# Patient Record
Sex: FEMALE
[De-identification: names, ages, dates, MRNs, and addresses within clinical notes are randomized per-mention and may not be internally consistent; named-entity substitution may affect disease eponyms.]

---

## 2018-02-24 ENCOUNTER — HOSPITAL ENCOUNTER (EMERGENCY)
Dept: HOSPITAL 90 - EDH | Age: 83
LOS: 1 days | Discharge: ANOTHER HOSPITAL | End: 2018-02-25
Payer: MEDICARE

## 2018-02-24 DIAGNOSIS — I63.9: Primary | ICD-10-CM

## 2018-02-24 DIAGNOSIS — R35.0: ICD-10-CM

## 2018-02-24 DIAGNOSIS — M81.0: ICD-10-CM

## 2018-02-24 DIAGNOSIS — H40.9: ICD-10-CM

## 2018-02-24 DIAGNOSIS — R11.0: ICD-10-CM

## 2018-02-24 DIAGNOSIS — I72.0: ICD-10-CM

## 2018-02-24 DIAGNOSIS — M06.9: ICD-10-CM

## 2018-02-24 DIAGNOSIS — Z79.899: ICD-10-CM

## 2018-02-24 DIAGNOSIS — Z88.8: ICD-10-CM

## 2018-02-24 LAB
ALBUMIN SERPL-MCNC: 3.2 G/DL (ref 3.5–5)
ALT SERPL-CCNC: 15 U/L (ref 12–78)
AMPHETAMINES UR QL SCN: NEGATIVE
APTT PPP: 24 SEC (ref 26.3–35.5)
AST SERPL-CCNC: 17 U/L (ref 10–37)
BARBITURATES UR QL SCN: NEGATIVE
BASOPHILS NFR BLD AUTO: 1.1 % (ref 0–5)
BENZODIAZ UR QL SCN: NEGATIVE
BILIRUB SERPL-MCNC: 0.2 MG/DL (ref 0.2–1)
BUN SERPL-MCNC: 15 MG/DL (ref 7–18)
BZE UR QL SCN: NEGATIVE
CANNABINOIDS UR QL SCN: NEGATIVE
CHLORIDE SERPL-SCNC: 108 MMOL/L (ref 101–111)
CK MB SERPL-MCNC: < 0.5 NG/ML (ref 0.5–3.6)
CK SERPL-CCNC: 68 U/L (ref 21–232)
CO2 SERPL-SCNC: 29 MMOL/L (ref 21–32)
CREAT SERPL-MCNC: 1.1 MG/DL (ref 0.5–1.5)
EOSINOPHIL NFR BLD AUTO: 2.6 % (ref 0–8)
ERYTHROCYTE [DISTWIDTH] IN BLOOD BY AUTOMATED COUNT: 13.9 % (ref 11–15.5)
GFR SERPL CREATININE-BSD FRML MDRD: 50 ML/MIN (ref 60–?)
GLUCOSE SERPL-MCNC: 96 MG/DL (ref 70–105)
HCT VFR BLD AUTO: 34.4 % (ref 36–48)
INR PPP: 0.96 (ref 0.85–1.15)
LYMPHOCYTES NFR SPEC AUTO: 35.3 % (ref 21–51)
MCH RBC QN AUTO: 30.4 PG (ref 27–33)
MCHC RBC AUTO-ENTMCNC: 33.5 G/DL (ref 32–36)
MCV RBC AUTO: 90.5 FL (ref 79–99)
MONOCYTES NFR BLD AUTO: 7.7 % (ref 3–13)
MYOGLOBIN SERPL-MCNC: 49 NG/ML (ref 10–92)
NEUTROPHILS NFR BLD AUTO: 53.3 % (ref 40–77)
NRBC BLD MANUAL-RTO: 0.1 % (ref 0–0.19)
OPIATES UR QL SCN: NEGATIVE
PCP UR QL SCN: NEGATIVE
PH UR STRIP: 6 [PH] (ref 5–8)
PLATELET # BLD AUTO: 209 K/UL (ref 130–400)
POTASSIUM SERPL-SCNC: 3.7 MMOL/L (ref 3.5–5.1)
PROT SERPL-MCNC: 6.7 G/DL (ref 6–8.3)
PROTHROMBIN TIME: 10.1 SEC (ref 9.6–11.6)
RBC # BLD AUTO: 3.8 MIL/UL (ref 4–5.5)
RBC #/AREA URNS HPF: (no result) /HPF (ref 0–1)
SODIUM SERPL-SCNC: 144 MMOL/L (ref 136–145)
SP GR UR STRIP: 1.02 (ref 1–1.03)
UROBILINOGEN UR STRIP-MCNC: 0.2 MG/DL (ref 0.2–1)
WBC # BLD AUTO: 5 K/UL (ref 4.8–10.8)
WBC #/AREA URNS HPF: (no result) /HPF (ref 0–1)

## 2018-02-24 PROCEDURE — 36415 COLL VENOUS BLD VENIPUNCTURE: CPT

## 2018-02-24 PROCEDURE — 99285 EMERGENCY DEPT VISIT HI MDM: CPT

## 2018-02-24 PROCEDURE — 71045 X-RAY EXAM CHEST 1 VIEW: CPT

## 2018-02-24 PROCEDURE — 94761 N-INVAS EAR/PLS OXIMETRY MLT: CPT

## 2018-02-24 PROCEDURE — 82550 ASSAY OF CK (CPK): CPT

## 2018-02-24 PROCEDURE — 82948 REAGENT STRIP/BLOOD GLUCOSE: CPT

## 2018-02-24 PROCEDURE — 85025 COMPLETE CBC W/AUTO DIFF WBC: CPT

## 2018-02-24 PROCEDURE — 82553 CREATINE MB FRACTION: CPT

## 2018-02-24 PROCEDURE — 85610 PROTHROMBIN TIME: CPT

## 2018-02-24 PROCEDURE — 83874 ASSAY OF MYOGLOBIN: CPT

## 2018-02-24 PROCEDURE — 93005 ELECTROCARDIOGRAM TRACING: CPT

## 2018-02-24 PROCEDURE — 85730 THROMBOPLASTIN TIME PARTIAL: CPT

## 2018-02-24 PROCEDURE — 80053 COMPREHEN METABOLIC PANEL: CPT

## 2018-02-24 PROCEDURE — 81001 URINALYSIS AUTO W/SCOPE: CPT

## 2018-02-24 PROCEDURE — 80305 DRUG TEST PRSMV DIR OPT OBS: CPT

## 2018-02-24 PROCEDURE — 84484 ASSAY OF TROPONIN QUANT: CPT

## 2018-02-24 PROCEDURE — 70450 CT HEAD/BRAIN W/O DYE: CPT

## 2020-05-14 ENCOUNTER — HOSPITAL ENCOUNTER (INPATIENT)
Dept: HOSPITAL 90 - EDH | Age: 85
LOS: 6 days | Discharge: HOME HEALTH | DRG: 871 | End: 2020-05-20
Attending: INTERNAL MEDICINE | Admitting: INTERNAL MEDICINE
Payer: MEDICARE

## 2020-05-14 VITALS — SYSTOLIC BLOOD PRESSURE: 119 MMHG | DIASTOLIC BLOOD PRESSURE: 51 MMHG

## 2020-05-14 VITALS — BODY MASS INDEX: 19.91 KG/M2 | WEIGHT: 101.4 LBS | HEIGHT: 60 IN

## 2020-05-14 DIAGNOSIS — E44.1: ICD-10-CM

## 2020-05-14 DIAGNOSIS — E86.0: ICD-10-CM

## 2020-05-14 DIAGNOSIS — G93.41: ICD-10-CM

## 2020-05-14 DIAGNOSIS — R13.10: ICD-10-CM

## 2020-05-14 DIAGNOSIS — I10: ICD-10-CM

## 2020-05-14 DIAGNOSIS — M81.0: ICD-10-CM

## 2020-05-14 DIAGNOSIS — E87.1: ICD-10-CM

## 2020-05-14 DIAGNOSIS — H54.61: ICD-10-CM

## 2020-05-14 DIAGNOSIS — R53.81: ICD-10-CM

## 2020-05-14 DIAGNOSIS — Z88.6: ICD-10-CM

## 2020-05-14 DIAGNOSIS — I69.354: ICD-10-CM

## 2020-05-14 DIAGNOSIS — E87.0: ICD-10-CM

## 2020-05-14 DIAGNOSIS — E86.9: ICD-10-CM

## 2020-05-14 DIAGNOSIS — N39.0: ICD-10-CM

## 2020-05-14 DIAGNOSIS — M06.9: ICD-10-CM

## 2020-05-14 DIAGNOSIS — R62.7: ICD-10-CM

## 2020-05-14 DIAGNOSIS — A41.9: Primary | ICD-10-CM

## 2020-05-14 LAB
ALBUMIN SERPL-MCNC: 3 G/DL (ref 3.5–5)
ALBUMIN SERPL-MCNC: 3.3 G/DL (ref 3.5–5)
ALT SERPL-CCNC: 27 U/L (ref 12–78)
ALT SERPL-CCNC: 30 U/L (ref 12–78)
AST SERPL-CCNC: 32 U/L (ref 10–37)
AST SERPL-CCNC: 35 U/L (ref 10–37)
BASOPHILS NFR BLD AUTO: 0.2 % (ref 0–5)
BILIRUB SERPL-MCNC: 0.3 MG/DL (ref 0.2–1)
BILIRUB SERPL-MCNC: 0.4 MG/DL (ref 0.2–1)
BUN SERPL-MCNC: 24 MG/DL (ref 7–18)
BUN SERPL-MCNC: 28 MG/DL (ref 7–18)
CHLORIDE SERPL-SCNC: 110 MMOL/L (ref 101–111)
CHLORIDE SERPL-SCNC: 114 MMOL/L (ref 101–111)
CO2 SERPL-SCNC: 29 MMOL/L (ref 21–32)
CO2 SERPL-SCNC: 31 MMOL/L (ref 21–32)
CREAT SERPL-MCNC: 0.8 MG/DL (ref 0.5–1.5)
CREAT SERPL-MCNC: 0.9 MG/DL (ref 0.5–1.5)
EOSINOPHIL NFR BLD AUTO: 0 % (ref 0–8)
ERYTHROCYTE [DISTWIDTH] IN BLOOD BY AUTOMATED COUNT: 13.7 % (ref 11–15.5)
GFR SERPL CREATININE-BSD FRML MDRD: 63 ML/MIN (ref 60–?)
GFR SERPL CREATININE-BSD FRML MDRD: 72 ML/MIN (ref 60–?)
GLUCOSE SERPL-MCNC: 87 MG/DL (ref 70–105)
GLUCOSE SERPL-MCNC: 94 MG/DL (ref 70–105)
HCT VFR BLD AUTO: 42.7 % (ref 36–48)
LYMPHOCYTES NFR SPEC AUTO: 9.4 % (ref 21–51)
MCH RBC QN AUTO: 31.5 PG (ref 27–33)
MCHC RBC AUTO-ENTMCNC: 31.6 G/DL (ref 32–36)
MCV RBC AUTO: 99.5 FL (ref 79–99)
MONOCYTES NFR BLD AUTO: 5.1 % (ref 3–13)
NEUTROPHILS NFR BLD AUTO: 84.9 % (ref 40–77)
NRBC BLD MANUAL-RTO: 0 % (ref 0–0.19)
PH UR STRIP: 6.5 [PH] (ref 5–8)
PLATELET # BLD AUTO: 203 K/UL (ref 130–400)
POTASSIUM SERPL-SCNC: 3.3 MMOL/L (ref 3.5–5.1)
POTASSIUM SERPL-SCNC: 3.7 MMOL/L (ref 3.5–5.1)
PROT SERPL-MCNC: 6.3 G/DL (ref 6–8.3)
PROT SERPL-MCNC: 7.1 G/DL (ref 6–8.3)
PROT UR QL STRIP: (no result) MG/DL
RBC # BLD AUTO: 4.29 MIL/UL (ref 4–5.5)
RBC #/AREA URNS HPF: (no result) /HPF (ref 0–1)
SODIUM SERPL-SCNC: 149 MMOL/L (ref 136–145)
SODIUM SERPL-SCNC: 151 MMOL/L (ref 136–145)
SP GR UR STRIP: 1.02 (ref 1–1.03)
UROBILINOGEN UR STRIP-MCNC: 0.2 MG/DL (ref 0.2–1)
WBC # BLD AUTO: 11.2 K/UL (ref 4.8–10.8)
WBC #/AREA URNS HPF: (no result) /HPF (ref 0–1)

## 2020-05-14 PROCEDURE — 80053 COMPREHEN METABOLIC PANEL: CPT

## 2020-05-14 PROCEDURE — 92610 EVALUATE SWALLOWING FUNCTION: CPT

## 2020-05-14 PROCEDURE — 85027 COMPLETE CBC AUTOMATED: CPT

## 2020-05-14 PROCEDURE — 43246 EGD PLACE GASTROSTOMY TUBE: CPT

## 2020-05-14 PROCEDURE — 93005 ELECTROCARDIOGRAM TRACING: CPT

## 2020-05-14 PROCEDURE — 82140 ASSAY OF AMMONIA: CPT

## 2020-05-14 PROCEDURE — 84145 PROCALCITONIN (PCT): CPT

## 2020-05-14 PROCEDURE — 84100 ASSAY OF PHOSPHORUS: CPT

## 2020-05-14 PROCEDURE — 80048 BASIC METABOLIC PNL TOTAL CA: CPT

## 2020-05-14 PROCEDURE — 84443 ASSAY THYROID STIM HORMONE: CPT

## 2020-05-14 PROCEDURE — 84484 ASSAY OF TROPONIN QUANT: CPT

## 2020-05-14 PROCEDURE — 82948 REAGENT STRIP/BLOOD GLUCOSE: CPT

## 2020-05-14 PROCEDURE — 70551 MRI BRAIN STEM W/O DYE: CPT

## 2020-05-14 PROCEDURE — 71045 X-RAY EXAM CHEST 1 VIEW: CPT

## 2020-05-14 PROCEDURE — 81001 URINALYSIS AUTO W/SCOPE: CPT

## 2020-05-14 PROCEDURE — 85025 COMPLETE CBC W/AUTO DIFF WBC: CPT

## 2020-05-14 PROCEDURE — 87088 URINE BACTERIA CULTURE: CPT

## 2020-05-14 PROCEDURE — 36415 COLL VENOUS BLD VENIPUNCTURE: CPT

## 2020-05-14 PROCEDURE — 92522 EVALUATE SPEECH PRODUCTION: CPT

## 2020-05-14 PROCEDURE — 83735 ASSAY OF MAGNESIUM: CPT

## 2020-05-14 NOTE — NUR
Received patient from ED to room 426 at 2005 on room air with no complaints of dizziness or 
weakness. Orders received to start D5W in right AC. MRI ordered and staff asked Nile, the 
patient's daughter about any metal in her body.  She stated she had a right hip surgery with 
a gloria placed. MRI tech contacted to update what patient has for metal and tech stated it 
will be OK.

## 2020-05-15 VITALS — SYSTOLIC BLOOD PRESSURE: 160 MMHG | DIASTOLIC BLOOD PRESSURE: 59 MMHG

## 2020-05-15 VITALS — SYSTOLIC BLOOD PRESSURE: 141 MMHG | DIASTOLIC BLOOD PRESSURE: 41 MMHG

## 2020-05-15 VITALS — SYSTOLIC BLOOD PRESSURE: 128 MMHG | DIASTOLIC BLOOD PRESSURE: 73 MMHG

## 2020-05-15 VITALS — DIASTOLIC BLOOD PRESSURE: 56 MMHG | SYSTOLIC BLOOD PRESSURE: 129 MMHG

## 2020-05-15 VITALS — SYSTOLIC BLOOD PRESSURE: 121 MMHG | DIASTOLIC BLOOD PRESSURE: 64 MMHG

## 2020-05-15 VITALS — SYSTOLIC BLOOD PRESSURE: 136 MMHG | DIASTOLIC BLOOD PRESSURE: 97 MMHG

## 2020-05-15 VITALS — DIASTOLIC BLOOD PRESSURE: 47 MMHG | SYSTOLIC BLOOD PRESSURE: 126 MMHG

## 2020-05-15 LAB
ALBUMIN SERPL-MCNC: 2.8 G/DL (ref 3.5–5)
ALT SERPL-CCNC: 25 U/L (ref 12–78)
AST SERPL-CCNC: 35 U/L (ref 10–37)
BASOPHILS NFR BLD AUTO: 0.3 % (ref 0–5)
BILIRUB SERPL-MCNC: 0.3 MG/DL (ref 0.2–1)
BUN SERPL-MCNC: 23 MG/DL (ref 7–18)
CHLORIDE SERPL-SCNC: 114 MMOL/L (ref 101–111)
CO2 SERPL-SCNC: 27 MMOL/L (ref 21–32)
CREAT SERPL-MCNC: 0.8 MG/DL (ref 0.5–1.5)
EOSINOPHIL NFR BLD AUTO: 0 % (ref 0–8)
ERYTHROCYTE [DISTWIDTH] IN BLOOD BY AUTOMATED COUNT: 13.7 % (ref 11–15.5)
GFR SERPL CREATININE-BSD FRML MDRD: 72 ML/MIN (ref 60–?)
GLUCOSE SERPL-MCNC: 93 MG/DL (ref 70–105)
HCT VFR BLD AUTO: 38.8 % (ref 36–48)
LYMPHOCYTES NFR SPEC AUTO: 12.8 % (ref 21–51)
MCH RBC QN AUTO: 31 PG (ref 27–33)
MCHC RBC AUTO-ENTMCNC: 30.9 G/DL (ref 32–36)
MCV RBC AUTO: 100.3 FL (ref 79–99)
MONOCYTES NFR BLD AUTO: 6.2 % (ref 3–13)
NEUTROPHILS NFR BLD AUTO: 80.2 % (ref 40–77)
NRBC BLD MANUAL-RTO: 0 % (ref 0–0.19)
PH UR STRIP: 7 [PH] (ref 5–8)
PLATELET # BLD AUTO: 188 K/UL (ref 130–400)
POTASSIUM SERPL-SCNC: 3.2 MMOL/L (ref 3.5–5.1)
PROT SERPL-MCNC: 6.1 G/DL (ref 6–8.3)
RBC # BLD AUTO: 3.87 MIL/UL (ref 4–5.5)
RBC #/AREA URNS HPF: (no result) /HPF (ref 0–1)
SODIUM SERPL-SCNC: 150 MMOL/L (ref 136–145)
SP GR UR STRIP: 1.01 (ref 1–1.03)
UROBILINOGEN UR STRIP-MCNC: 0.2 MG/DL (ref 0.2–1)
WBC # BLD AUTO: 9.9 K/UL (ref 4.8–10.8)
WBC #/AREA URNS HPF: (no result) /HPF (ref 0–1)

## 2020-05-15 RX ADMIN — POTASSIUM CHLORIDE PRN MLS/HR: 14.9 INJECTION, SOLUTION INTRAVENOUS at 02:00

## 2020-05-15 RX ADMIN — FAMOTIDINE SCH MG: 10 INJECTION INTRAVENOUS at 08:49

## 2020-05-15 RX ADMIN — POTASSIUM CHLORIDE PRN MLS/HR: 14.9 INJECTION, SOLUTION INTRAVENOUS at 03:14

## 2020-05-15 RX ADMIN — DEXTROSE AND SODIUM CHLORIDE SCH MLS/HR: 5; .9 INJECTION, SOLUTION INTRAVENOUS at 21:51

## 2020-05-15 RX ADMIN — FAMOTIDINE SCH MG: 10 INJECTION INTRAVENOUS at 21:43

## 2020-05-15 RX ADMIN — SODIUM CHLORIDE SCH GM: 9 INJECTION, SOLUTION INTRAVENOUS at 08:49

## 2020-05-15 RX ADMIN — SODIUM CHLORIDE SCH GM: 9 INJECTION, SOLUTION INTRAVENOUS at 00:40

## 2020-05-15 RX ADMIN — ENOXAPARIN SODIUM SCH MG: 30 INJECTION SUBCUTANEOUS at 08:49

## 2020-05-15 RX ADMIN — SODIUM CHLORIDE SCH GM: 9 INJECTION, SOLUTION INTRAVENOUS at 21:43

## 2020-05-15 NOTE — NUR
PM Assessment



Received pt awake, calm with D5W at 75cc/hr infusing well, routine assessment done, pt noted 
oriented only to name, re-  oriented to time/place & made aware NPO for now. Pt currently 
denies pain. Per report waiting for Dr. Pringle to rounds to notify re: speech therapist 
recommendations, PEG placement as family was made aware & pending their final decision.

## 2020-05-15 NOTE — NUR
DCP: HOME



SW spoke to daughter Cindy Schroeder [phone], who pt lives with. pt's grandson 22, also lives 
in the home. Pt has daily provider services 8:30 to 5:30 thru [facility], pt spends most of 
the day in w/c or recliner, is unable to ambulate or stand on her own. Has walker w/c and 
shower bench. PCP is Dr Regalado in [place] and uses [facility] for rx. Daughter states plan is home at 
[place], and pt can transport in private car. Nurse Fede talked to daughter about PEG. waiting 
on decision. CM to follow and assist as needed

-------------------------------------------------------------------------------

Addendum: 05/15/20 at 1512 by JR KERR

-------------------------------------------------------------------------------

Amended: Links added.

## 2020-05-15 NOTE — NUR
DYSPHAGIA EVAL COMPLETED



+S/S OF ASPIRATION WITH THIN, NECTAR, AND HONEY THICK CONSISTENCIES. Pt WITH WET VOCAL 
QUALITY WITH PUDDING AND PUREED. 

RECOMMEND LONG TERM ALTERNATE MEANS OF NUTRITION/HYDRATION AT THIS TIME. 



SLP REVIEWED RESULTS AND RECOMMENDATIONS WITH Pt AND WITH DAUGHTER (ENMA) OVER THE PHONE. 



RECOMMENDATIONS:

DYSPHAGIA THERAPY 3-5XWEEK TO INCREASE ORAL MOTOR STRENGTH AND PHARYNGEAL SWALLOW:

LTG#1: Pt WILL TOLERATE LEAST RESTRICTIVE DIET TO MEET NUTRITION/HYDRATION WITH NO S/S OF 
ASPIRATION. 

LTG#2: SKILLED EDUCATION Pt/FAMILY/STAFF

STG#1: Pt WILL PARTICIPATE IN LARYNGEAL ELEVATION/EXCURSION EXERCISES WITH 80% ACCURACY.

STG#2: Pt WILL PARTICIPATE IN TONGUE BASE RETRACTION EXERCISES WITH 80% ACCURACY.

STG#3: Pt WILL PARTICIPATE IN ORAL MOTOR EXERCISES WITH 80% ACCURACY.

STG#4: Pt WILL TOLERATE THERAPEUTIC TRIALS OF PUDDING THICK LIQUIDS WITH NO OVERT S/S OF 
ASPIRATION.

STG#5: Pt WILL BE ABLE TO PARTICIPATE IN MBSS AFTER 2-4 WEEKS OF THERAPEUTIC INTERVENTION.

STG#6: SKILLED EDUCATION Pt/FAMILY/STAFF.



SLP COORDINATED CARE WITH NURSE BOSWELL. 


-------------------------------------------------------------------------------

Addendum: 05/15/20 at [address] MIGUELINA RAMACHANDRAN

-------------------------------------------------------------------------------

Amended: Links added.

## 2020-05-16 VITALS — SYSTOLIC BLOOD PRESSURE: 134 MMHG | DIASTOLIC BLOOD PRESSURE: 64 MMHG

## 2020-05-16 VITALS — SYSTOLIC BLOOD PRESSURE: 146 MMHG | DIASTOLIC BLOOD PRESSURE: 75 MMHG

## 2020-05-16 VITALS — DIASTOLIC BLOOD PRESSURE: 93 MMHG | SYSTOLIC BLOOD PRESSURE: 134 MMHG

## 2020-05-16 VITALS — DIASTOLIC BLOOD PRESSURE: 100 MMHG | SYSTOLIC BLOOD PRESSURE: 152 MMHG

## 2020-05-16 VITALS — DIASTOLIC BLOOD PRESSURE: 63 MMHG | SYSTOLIC BLOOD PRESSURE: 137 MMHG

## 2020-05-16 LAB
ALBUMIN SERPL-MCNC: 2.8 G/DL (ref 3.5–5)
ALT SERPL-CCNC: 23 U/L (ref 12–78)
AST SERPL-CCNC: 35 U/L (ref 10–37)
BASOPHILS NFR BLD AUTO: 0.2 % (ref 0–5)
BILIRUB SERPL-MCNC: 0.4 MG/DL (ref 0.2–1)
BUN SERPL-MCNC: 12 MG/DL (ref 7–18)
CHLORIDE SERPL-SCNC: 105 MMOL/L (ref 101–111)
CO2 SERPL-SCNC: 24 MMOL/L (ref 21–32)
CREAT SERPL-MCNC: 0.7 MG/DL (ref 0.5–1.5)
EOSINOPHIL NFR BLD AUTO: 0 % (ref 0–8)
ERYTHROCYTE [DISTWIDTH] IN BLOOD BY AUTOMATED COUNT: 13.6 % (ref 11–15.5)
GFR SERPL CREATININE-BSD FRML MDRD: 84 ML/MIN (ref 60–?)
GLUCOSE SERPL-MCNC: 125 MG/DL (ref 70–105)
HCT VFR BLD AUTO: 40.5 % (ref 36–48)
LYMPHOCYTES NFR SPEC AUTO: 8.9 % (ref 21–51)
MAGNESIUM SERPL-MCNC: 1.6 MG/DL (ref 1.8–2.4)
MCH RBC QN AUTO: 30.8 PG (ref 27–33)
MCHC RBC AUTO-ENTMCNC: 32.3 G/DL (ref 32–36)
MCV RBC AUTO: 95.1 FL (ref 79–99)
MONOCYTES NFR BLD AUTO: 4.7 % (ref 3–13)
NEUTROPHILS NFR BLD AUTO: 85.9 % (ref 40–77)
NRBC BLD MANUAL-RTO: 0 % (ref 0–0.19)
PHOSPHATE SERPL-MCNC: 2.1 MG/DL (ref 2.5–4.9)
PLATELET # BLD AUTO: 125 K/UL (ref 130–400)
POTASSIUM SERPL-SCNC: 3.5 MMOL/L (ref 3.5–5.1)
PROT SERPL-MCNC: 6.3 G/DL (ref 6–8.3)
RBC # BLD AUTO: 4.26 MIL/UL (ref 4–5.5)
SODIUM SERPL-SCNC: 137 MMOL/L (ref 136–145)
TSH SERPL DL<=0.05 MIU/L-ACNC: 3.21 UIU/ML (ref 0.36–3.74)
WBC # BLD AUTO: 14.9 K/UL (ref 4.8–10.8)

## 2020-05-16 RX ADMIN — ENOXAPARIN SODIUM SCH MG: 30 INJECTION SUBCUTANEOUS at 09:00

## 2020-05-16 RX ADMIN — SODIUM CHLORIDE SCH GM: 9 INJECTION, SOLUTION INTRAVENOUS at 10:03

## 2020-05-16 RX ADMIN — DEXTROSE AND SODIUM CHLORIDE SCH MLS/HR: 5; .9 INJECTION, SOLUTION INTRAVENOUS at 12:43

## 2020-05-16 RX ADMIN — FAMOTIDINE SCH MG: 10 INJECTION INTRAVENOUS at 10:03

## 2020-05-16 RX ADMIN — SODIUM CHLORIDE SCH GM: 9 INJECTION, SOLUTION INTRAVENOUS at 21:15

## 2020-05-16 RX ADMIN — FAMOTIDINE SCH MG: 10 INJECTION INTRAVENOUS at 21:10

## 2020-05-16 NOTE — NUR
PM Assessment



Received pt sleeping easily awaken with D5W + 20meq KCL at 75cc/hr infusing well. Routine 
assessment done, plan of care discuss, made aware that she is schedules for PEG placement in 
the morning, remain NPO & that she will be bath later. Pt currently denies discomfort. Mouth 
care rendered.

## 2020-05-17 VITALS — DIASTOLIC BLOOD PRESSURE: 67 MMHG | SYSTOLIC BLOOD PRESSURE: 113 MMHG

## 2020-05-17 VITALS — DIASTOLIC BLOOD PRESSURE: 61 MMHG | SYSTOLIC BLOOD PRESSURE: 133 MMHG

## 2020-05-17 VITALS — DIASTOLIC BLOOD PRESSURE: 59 MMHG | SYSTOLIC BLOOD PRESSURE: 130 MMHG

## 2020-05-17 VITALS — DIASTOLIC BLOOD PRESSURE: 71 MMHG | SYSTOLIC BLOOD PRESSURE: 140 MMHG

## 2020-05-17 VITALS — SYSTOLIC BLOOD PRESSURE: 117 MMHG | DIASTOLIC BLOOD PRESSURE: 59 MMHG

## 2020-05-17 VITALS — DIASTOLIC BLOOD PRESSURE: 58 MMHG | SYSTOLIC BLOOD PRESSURE: 108 MMHG

## 2020-05-17 VITALS — SYSTOLIC BLOOD PRESSURE: 133 MMHG | DIASTOLIC BLOOD PRESSURE: 62 MMHG

## 2020-05-17 VITALS — SYSTOLIC BLOOD PRESSURE: 107 MMHG | DIASTOLIC BLOOD PRESSURE: 38 MMHG

## 2020-05-17 VITALS — DIASTOLIC BLOOD PRESSURE: 73 MMHG | SYSTOLIC BLOOD PRESSURE: 135 MMHG

## 2020-05-17 VITALS — SYSTOLIC BLOOD PRESSURE: 133 MMHG | DIASTOLIC BLOOD PRESSURE: 71 MMHG

## 2020-05-17 VITALS — DIASTOLIC BLOOD PRESSURE: 60 MMHG | SYSTOLIC BLOOD PRESSURE: 132 MMHG

## 2020-05-17 VITALS — SYSTOLIC BLOOD PRESSURE: 111 MMHG | DIASTOLIC BLOOD PRESSURE: 48 MMHG

## 2020-05-17 VITALS — SYSTOLIC BLOOD PRESSURE: 111 MMHG | DIASTOLIC BLOOD PRESSURE: 49 MMHG

## 2020-05-17 VITALS — DIASTOLIC BLOOD PRESSURE: 63 MMHG | SYSTOLIC BLOOD PRESSURE: 123 MMHG

## 2020-05-17 VITALS — DIASTOLIC BLOOD PRESSURE: 59 MMHG | SYSTOLIC BLOOD PRESSURE: 120 MMHG

## 2020-05-17 VITALS — DIASTOLIC BLOOD PRESSURE: 49 MMHG | SYSTOLIC BLOOD PRESSURE: 111 MMHG

## 2020-05-17 VITALS — DIASTOLIC BLOOD PRESSURE: 66 MMHG | SYSTOLIC BLOOD PRESSURE: 138 MMHG

## 2020-05-17 VITALS — SYSTOLIC BLOOD PRESSURE: 123 MMHG | DIASTOLIC BLOOD PRESSURE: 62 MMHG

## 2020-05-17 VITALS — DIASTOLIC BLOOD PRESSURE: 73 MMHG | SYSTOLIC BLOOD PRESSURE: 115 MMHG

## 2020-05-17 VITALS — SYSTOLIC BLOOD PRESSURE: 119 MMHG | DIASTOLIC BLOOD PRESSURE: 68 MMHG

## 2020-05-17 VITALS — DIASTOLIC BLOOD PRESSURE: 78 MMHG | SYSTOLIC BLOOD PRESSURE: 129 MMHG

## 2020-05-17 LAB
ALBUMIN SERPL-MCNC: 2.6 G/DL (ref 3.5–5)
ALT SERPL-CCNC: 23 U/L (ref 12–78)
AST SERPL-CCNC: 29 U/L (ref 10–37)
BASOPHILS NFR BLD AUTO: 0.2 % (ref 0–5)
BILIRUB SERPL-MCNC: 0.4 MG/DL (ref 0.2–1)
BUN SERPL-MCNC: 8 MG/DL (ref 7–18)
CHLORIDE SERPL-SCNC: 101 MMOL/L (ref 101–111)
CO2 SERPL-SCNC: 23 MMOL/L (ref 21–32)
CREAT SERPL-MCNC: 0.7 MG/DL (ref 0.5–1.5)
EOSINOPHIL NFR BLD AUTO: 0 % (ref 0–8)
ERYTHROCYTE [DISTWIDTH] IN BLOOD BY AUTOMATED COUNT: 13.2 % (ref 11–15.5)
GFR SERPL CREATININE-BSD FRML MDRD: 84 ML/MIN (ref 60–?)
GLUCOSE SERPL-MCNC: 141 MG/DL (ref 70–105)
HCT VFR BLD AUTO: 45.6 % (ref 36–48)
LYMPHOCYTES NFR SPEC AUTO: 7.4 % (ref 21–51)
MCH RBC QN AUTO: 30.9 PG (ref 27–33)
MCHC RBC AUTO-ENTMCNC: 32.7 G/DL (ref 32–36)
MCV RBC AUTO: 94.6 FL (ref 79–99)
MONOCYTES NFR BLD AUTO: 5.2 % (ref 3–13)
NEUTROPHILS NFR BLD AUTO: 86.6 % (ref 40–77)
NRBC BLD MANUAL-RTO: 0 % (ref 0–0.19)
PLATELET # BLD AUTO: 229 K/UL (ref 130–400)
POTASSIUM SERPL-SCNC: 4.1 MMOL/L (ref 3.5–5.1)
PROT SERPL-MCNC: 6.7 G/DL (ref 6–8.3)
RBC # BLD AUTO: 4.82 MIL/UL (ref 4–5.5)
SODIUM SERPL-SCNC: 133 MMOL/L (ref 136–145)
WBC # BLD AUTO: 15.9 K/UL (ref 4.8–10.8)

## 2020-05-17 PROCEDURE — 0DH63UZ INSERTION OF FEEDING DEVICE INTO STOMACH, PERCUTANEOUS APPROACH: ICD-10-PCS | Performed by: INTERNAL MEDICINE

## 2020-05-17 RX ADMIN — ENOXAPARIN SODIUM SCH MG: 30 INJECTION SUBCUTANEOUS at 09:00

## 2020-05-17 RX ADMIN — SODIUM CHLORIDE SCH MLS/HR: 0.9 INJECTION, SOLUTION INTRAVENOUS at 15:30

## 2020-05-17 RX ADMIN — FAMOTIDINE SCH MG: 10 INJECTION INTRAVENOUS at 20:04

## 2020-05-17 RX ADMIN — SODIUM CHLORIDE SCH GM: 9 INJECTION, SOLUTION INTRAVENOUS at 13:03

## 2020-05-17 RX ADMIN — SODIUM CHLORIDE SCH GM: 9 INJECTION, SOLUTION INTRAVENOUS at 20:05

## 2020-05-17 RX ADMIN — FAMOTIDINE SCH MG: 10 INJECTION INTRAVENOUS at 13:04

## 2020-05-17 RX ADMIN — DEXTROSE AND SODIUM CHLORIDE SCH MLS/HR: 5; .9 INJECTION, SOLUTION INTRAVENOUS at 01:00

## 2020-05-17 NOTE — NUR
Re: PEG placement



Pt taken down to GI per bed at this time for PEG placement, phoned daughter Cindy & made 
aware, then Chata from GI lab spoke with her discuss the type of anesthesia that will be used 
for this procedure.

## 2020-05-17 NOTE — NUR
Late entry , adm igm ancef iv push times once per verbal order of Doctor Ken for 
insertion of peg tube, patient tolerate well, adm at 0708 am

## 2020-05-18 VITALS — SYSTOLIC BLOOD PRESSURE: 129 MMHG | DIASTOLIC BLOOD PRESSURE: 68 MMHG

## 2020-05-18 VITALS — DIASTOLIC BLOOD PRESSURE: 59 MMHG | SYSTOLIC BLOOD PRESSURE: 126 MMHG

## 2020-05-18 VITALS — DIASTOLIC BLOOD PRESSURE: 64 MMHG | SYSTOLIC BLOOD PRESSURE: 128 MMHG

## 2020-05-18 VITALS — SYSTOLIC BLOOD PRESSURE: 161 MMHG | DIASTOLIC BLOOD PRESSURE: 60 MMHG

## 2020-05-18 VITALS — DIASTOLIC BLOOD PRESSURE: 65 MMHG | SYSTOLIC BLOOD PRESSURE: 145 MMHG

## 2020-05-18 RX ADMIN — FAMOTIDINE SCH MG: 10 INJECTION INTRAVENOUS at 09:00

## 2020-05-18 RX ADMIN — SODIUM CHLORIDE SCH MLS/HR: 0.9 INJECTION, SOLUTION INTRAVENOUS at 05:01

## 2020-05-18 RX ADMIN — SODIUM CHLORIDE SCH GM: 9 INJECTION, SOLUTION INTRAVENOUS at 20:15

## 2020-05-18 RX ADMIN — SODIUM CHLORIDE SCH GM: 9 INJECTION, SOLUTION INTRAVENOUS at 09:55

## 2020-05-18 RX ADMIN — SODIUM CHLORIDE SCH MLS/HR: 0.9 INJECTION, SOLUTION INTRAVENOUS at 18:10

## 2020-05-18 RX ADMIN — ENOXAPARIN SODIUM SCH MG: 30 INJECTION SUBCUTANEOUS at 09:55

## 2020-05-18 RX ADMIN — FAMOTIDINE SCH MG: 10 INJECTION INTRAVENOUS at 20:15

## 2020-05-18 NOTE — NUR
RD NOTIFICATION - TUBE FEEDING



Pt s/p PEG placement. Recommend continuous tube feeding Jevity 1.5 initiated at 20mls/hour.

Increase rate as tolerated by 5 mls every 5 hours to goal.

Goal: 40mls/hour to provided 1440kcal, 61gm protein.

Flushes:130mls every 6 hours.



Bolus Recommendations: 4 cans Jevity 1.5/day, (6AM-1can, 10AM-1can, 2PM-1can, 6PM-1can).

Flushes: 65-90mls before and after each feeding.



Recommendations placed in Pt chart. RN notified. 



Pt with new onset dysphagia. AMS. PCM. Pt with increased nutritional needs secondary to 
underweight, PCM, coccyx ulcer.

Tube feeding recommendations meeting increased Pt needs. 



RD to continue to monitor. Please notify as additional nutrition concerns arise. Thank you.

-------------------------------------------------------------------------------

Addendum: 05/18/20 at 1559 by DEVEN VALENTINE RD RD

-------------------------------------------------------------------------------

Amended: Links added.

## 2020-05-18 NOTE — NUR
CM NOTE/CORAM

AS PER DAUGHTER OK FOR CORAM, NOHEMY FILLED. CLINICALS AND DIETARY ORDER FAXED AND CONFIRMED 
RECEIVED AT [place]. AS PER DAUGHTER, WILL BE HERE TOMORROW AT 9AM TO BE TAUGHT BOLUS 
FEEDINGS. POSSIBLE DC TOMORROW TO HOME IF TOLERATED FEEDINGS.

## 2020-05-19 VITALS — SYSTOLIC BLOOD PRESSURE: 136 MMHG | DIASTOLIC BLOOD PRESSURE: 82 MMHG

## 2020-05-19 VITALS — SYSTOLIC BLOOD PRESSURE: 152 MMHG | DIASTOLIC BLOOD PRESSURE: 62 MMHG

## 2020-05-19 VITALS — DIASTOLIC BLOOD PRESSURE: 70 MMHG | SYSTOLIC BLOOD PRESSURE: 140 MMHG

## 2020-05-19 VITALS — DIASTOLIC BLOOD PRESSURE: 66 MMHG | SYSTOLIC BLOOD PRESSURE: 133 MMHG

## 2020-05-19 VITALS — SYSTOLIC BLOOD PRESSURE: 153 MMHG | DIASTOLIC BLOOD PRESSURE: 78 MMHG

## 2020-05-19 VITALS — DIASTOLIC BLOOD PRESSURE: 59 MMHG | SYSTOLIC BLOOD PRESSURE: 134 MMHG

## 2020-05-19 VITALS — SYSTOLIC BLOOD PRESSURE: 132 MMHG | DIASTOLIC BLOOD PRESSURE: 58 MMHG

## 2020-05-19 LAB
BUN SERPL-MCNC: 13 MG/DL (ref 7–18)
CHLORIDE SERPL-SCNC: 113 MMOL/L (ref 101–111)
CO2 SERPL-SCNC: 23 MMOL/L (ref 21–32)
CREAT SERPL-MCNC: 0.7 MG/DL (ref 0.5–1.5)
ERYTHROCYTE [DISTWIDTH] IN BLOOD BY AUTOMATED COUNT: 13.9 % (ref 11–15.5)
GFR SERPL CREATININE-BSD FRML MDRD: 84 ML/MIN (ref 60–?)
GLUCOSE SERPL-MCNC: 95 MG/DL (ref 70–105)
HCT VFR BLD AUTO: 37.7 % (ref 36–48)
MCH RBC QN AUTO: 30.9 PG (ref 27–33)
MCHC RBC AUTO-ENTMCNC: 32.1 G/DL (ref 32–36)
MCV RBC AUTO: 96.2 FL (ref 79–99)
NRBC BLD MANUAL-RTO: 0 % (ref 0–0.19)
PLATELET # BLD AUTO: 192 K/UL (ref 130–400)
POTASSIUM SERPL-SCNC: 3.1 MMOL/L (ref 3.5–5.1)
RBC # BLD AUTO: 3.92 MIL/UL (ref 4–5.5)
SODIUM SERPL-SCNC: 145 MMOL/L (ref 136–145)
WBC # BLD AUTO: 12.3 K/UL (ref 4.8–10.8)

## 2020-05-19 RX ADMIN — SODIUM CHLORIDE SCH GM: 9 INJECTION, SOLUTION INTRAVENOUS at 10:26

## 2020-05-19 RX ADMIN — Medication SCH MG: at 21:00

## 2020-05-19 RX ADMIN — ENOXAPARIN SODIUM SCH MG: 30 INJECTION SUBCUTANEOUS at 10:27

## 2020-05-19 RX ADMIN — SODIUM CHLORIDE SCH MLS/HR: 0.9 INJECTION, SOLUTION INTRAVENOUS at 20:50

## 2020-05-19 RX ADMIN — FAMOTIDINE SCH MG: 10 INJECTION INTRAVENOUS at 21:00

## 2020-05-19 RX ADMIN — SODIUM CHLORIDE SCH MLS/HR: 0.9 INJECTION, SOLUTION INTRAVENOUS at 06:11

## 2020-05-19 RX ADMIN — FAMOTIDINE SCH MG: 10 INJECTION INTRAVENOUS at 10:26

## 2020-05-19 RX ADMIN — SODIUM CHLORIDE SCH GM: 9 INJECTION, SOLUTION INTRAVENOUS at 22:30

## 2020-05-19 NOTE — NUR
CM NOTE/[facility]

CALLED [facility] AND SPOKE WITH NO. PENDING FINANCIAL REVIEW FOR PEG TUBE FEEDINGS, 
INSURANCE INFORMATION CONFIRMED, WILL PROCESS INFORMATION AND WILL CALL ME FOR UPDATES IF 
APPROVED.

## 2020-05-19 NOTE — NUR
bolus feeding

DAUGHTER HERE FOR BOLUS FEEDING EDUCATION.  EDUCATED ON BOLUS FEEDINGS, RETURNED PROPER 
DEMONSTRATION FOR PROPER BOLUS FEEDING.  PT TOLERATED ABOUT 200 CC OF FORMULA AND STATED 
FEELING FULL AND HAD SLIGHT COUGH.  CALL PLACED TO DIETICIAN AND MADE AWARE, AS PER TEERSA, 
STATES TO SPACE BOLUS FEEDINGS OUT AND GIVE PT QUANTITY CAN TOLERATE.  FNP MADE AWARE

## 2020-05-20 VITALS — DIASTOLIC BLOOD PRESSURE: 68 MMHG | SYSTOLIC BLOOD PRESSURE: 143 MMHG

## 2020-05-20 VITALS — DIASTOLIC BLOOD PRESSURE: 61 MMHG | SYSTOLIC BLOOD PRESSURE: 133 MMHG

## 2020-05-20 VITALS — SYSTOLIC BLOOD PRESSURE: 155 MMHG | DIASTOLIC BLOOD PRESSURE: 64 MMHG

## 2020-05-20 VITALS — DIASTOLIC BLOOD PRESSURE: 60 MMHG | SYSTOLIC BLOOD PRESSURE: 113 MMHG

## 2020-05-20 VITALS — DIASTOLIC BLOOD PRESSURE: 80 MMHG | SYSTOLIC BLOOD PRESSURE: 128 MMHG

## 2020-05-20 LAB
BUN SERPL-MCNC: 15 MG/DL (ref 7–18)
CHLORIDE SERPL-SCNC: 114 MMOL/L (ref 101–111)
CO2 SERPL-SCNC: 21 MMOL/L (ref 21–32)
CREAT SERPL-MCNC: 0.7 MG/DL (ref 0.5–1.5)
GFR SERPL CREATININE-BSD FRML MDRD: 84 ML/MIN (ref 60–?)
GLUCOSE SERPL-MCNC: 199 MG/DL (ref 70–105)
POTASSIUM SERPL-SCNC: 3.6 MMOL/L (ref 3.5–5.1)
SODIUM SERPL-SCNC: 144 MMOL/L (ref 136–145)

## 2020-05-20 RX ADMIN — Medication SCH MG: at 09:38

## 2020-05-20 RX ADMIN — ENOXAPARIN SODIUM SCH MG: 30 INJECTION SUBCUTANEOUS at 09:39

## 2020-05-20 NOTE — NUR
DISCHARGE

TELE ROBBIE REMOVED.  IV DC'D.  PT BELONGINGS GATHERED.  PT TO BE DC HOME TODAY.  EMS TO 
TRANSPORT PT HOME.

## 2020-05-20 NOTE — NUR
PATIENT ALERT TO SELF. TOLERATED BOLUS FEEDING X2. 0 RESIDUAL. NO S/S OF DISTRESS AFTER 
BOLUS FEEDING. RESTING IN BED WITH TURNS Q 2H. BATHED BY CNA. PATIENT SPEECH GARBLED. 
INCONTINENT OF BOWEL AND BLADDER, WEARING BRIEF.

## 2020-05-20 NOTE — NUR
CM NOTE

AS PER DOREEN AT [facility], PEG TUBE FEEDINGS COVERED AND TO BE DELIVERED TODAY AT HOME. PRIMARY 
NURSE, SIMON WU, MADE AWARE. WILL DC HOME TODAY VIA EMS. PHONE NUMBER FOR [facility] FOR FAMILY 
TO CALL PLACE IN DISCHARGE PAPERWORK.

## 2020-05-20 NOTE — NUR
COGNITIVE LINGUISTIC EVAL COMPLETED.



Pt WITH MILD COGNITIVE LINGUISTIC DEFICITS. 



SLP COORDINATED WITH NURSE MIRTA ABOUT CARE AND RECOMMENDATIONS. 



RECOMMENDATIONS: 

SKILLED SPEECH THERAPY 3-5x WEEK TARGETING COGNITIVE LINGUISTIC GOALS.

LTG#1: Pt WILL INCREASE COGNITIVE LINGUISTIC SKILLS TO EXPRESS WANTS AND NEEDS WITH MIN 
ASSISTANCE.

LTG#2: Pt WILL DEVELOP FUNCTIONAL ATTENTION SKILLS TO EFFECTIVELY ATTEND TO AND COMMUNICATE 
IN SIMPLE DAYLY LIVING TASKS IN FUNCTIONAL ENVIRONMENT. 

STG#1: Pt WILL FOLLOW 2-STEP COMMANDS WITH 80% ACCURACY.

STG#2: Pt WILL BE AAOX4 INDEPENDENTLY

STG#3: SKILLED EDUCATION Pt/FAMILY/STAFF.



G-CODES







-------------------------------------------------------------------------------

Addendum: 05/20/20 at 1342 [address] MIGUELINA RAMACHANDRAN

-------------------------------------------------------------------------------

Amended: Links added.

## 2020-05-20 NOTE — NUR
RD FOLLOW UP/UPDATE



RD contacted Pt Daughter and reviewed Tube Feeding Recommendations. Tube feeding schedule 
adapted for home to smaller, more frequent feedings as needed, 4 cans/day to meet Patient 
nutritional needs, per daughter's request. RD reviewed flushes with daughter. RD also 
provided contact information for daughter to contact as additional concerns arise. 



RD contacted yesterday by RN. Pt tolerated first two bolus feedings of the day, however Pt 
experienced fullness at afternoon feeding, feeding was therefore held and provided later in 
the evening. Tube feeding tolerated today (5/20) as per daughter. 

RD to continue to monitor. Please notify as additional nutrition concerns arise. 

-------------------------------------------------------------------------------

Addendum: 05/20/20 at 1233 by DEVEN VALENTINE RD RD

-------------------------------------------------------------------------------

Amended: Links added.

## 2020-05-20 NOTE — NUR
DISCHARGE

DISCHARGE INSTRUCTIONS REVIEWED W/PT'S DAUGHTER, ENMA, VIA TELEPHONE.  PROPER CARE & MGT OF 
UTI REVIEWED.  REINFORCED PEG TUBE BOLUS FEEDINGS.  NEW PRESCRIBED MEDICATIONS REVIEWED.  
INFORMED PRESCRIPTION TRANSMITTED TO PHARMACY IN FILE.